# Patient Record
Sex: MALE | Race: WHITE | Employment: OTHER | URBAN - METROPOLITAN AREA
[De-identification: names, ages, dates, MRNs, and addresses within clinical notes are randomized per-mention and may not be internally consistent; named-entity substitution may affect disease eponyms.]

---

## 2017-06-06 ENCOUNTER — OFFICE VISIT (OUTPATIENT)
Dept: INTERNAL MEDICINE CLINIC | Facility: CLINIC | Age: 52
End: 2017-06-06

## 2017-06-06 VITALS
OXYGEN SATURATION: 96 % | HEIGHT: 70 IN | SYSTOLIC BLOOD PRESSURE: 135 MMHG | WEIGHT: 207.69 LBS | HEART RATE: 56 BPM | BODY MASS INDEX: 29.73 KG/M2 | DIASTOLIC BLOOD PRESSURE: 88 MMHG

## 2017-06-06 DIAGNOSIS — J45.20 MILD INTERMITTENT ASTHMA WITHOUT COMPLICATION: Primary | ICD-10-CM

## 2017-06-06 PROCEDURE — 99212 OFFICE O/P EST SF 10 MIN: CPT | Performed by: INTERNAL MEDICINE

## 2017-06-06 PROCEDURE — 99202 OFFICE O/P NEW SF 15 MIN: CPT | Performed by: INTERNAL MEDICINE

## 2017-06-06 RX ORDER — ALBUTEROL SULFATE 90 UG/1
2 AEROSOL, METERED RESPIRATORY (INHALATION) EVERY 6 HOURS PRN
Qty: 1 INHALER | Refills: 1 | Status: SHIPPED | OUTPATIENT
Start: 2017-06-06 | End: 2017-11-15

## 2017-06-06 RX ORDER — ALPRAZOLAM 1 MG/1
TABLET ORAL
COMMUNITY

## 2017-06-06 NOTE — PROGRESS NOTES
HPI:    Patient ID: Brenda Jeffries is a 46year old male. Asthma  This is a chronic problem. The current episode started more than 1 year ago. The problem occurs constantly.  Progression since onset: Pt was admitted to [facility] ER for a severe asthma at atraumatic. Eyes: EOM are normal.   Cardiovascular: Normal rate, regular rhythm and normal heart sounds. Exam reveals no gallop and no friction rub. No murmur heard. Pulmonary/Chest: Effort normal and breath sounds normal. No respiratory distress.  H Signed: Micky Anguiano, 6/6/2017, 11:15 AM.    ISHAGUFTA MD,  personally performed the services described in this documentation. All medical record entries made by the scribe were at my direction and in my presence.   I have reviewed the chart and di

## 2017-06-08 ENCOUNTER — TELEPHONE (OUTPATIENT)
Dept: INTERNAL MEDICINE CLINIC | Facility: CLINIC | Age: 52
End: 2017-06-08

## 2017-06-08 NOTE — TELEPHONE ENCOUNTER
Patient is not sure but, one of the inhaler priscribed is not covered by pt's Ins. Please advise pt.

## 2017-06-08 NOTE — TELEPHONE ENCOUNTER
Pt states that the flovent is not covered by his insurance. Please advise on cheaper alternative for maintenance inhaler.

## 2017-06-09 ENCOUNTER — TELEPHONE (OUTPATIENT)
Dept: INTERNAL MEDICINE CLINIC | Facility: CLINIC | Age: 52
End: 2017-06-09

## 2017-06-09 NOTE — TELEPHONE ENCOUNTER
Call transferred by CSS: Pt informed of message below and agrees to contact his insurance and call back with less expensive covered alternative, so Dr Flory Almonte can Rx.

## 2017-06-09 NOTE — TELEPHONE ENCOUNTER
Called [facility] pharmacist stated that flovent is covered through the insurance but is costing the patient $250. Patient would have to check with insurance what medication is in a cheaper tier for him.     TCB- transfer to [facility]

## 2017-06-09 NOTE — TELEPHONE ENCOUNTER
Patient called back, he spoke to his insurance and they informed him that QVAR is covered with the 40 mcg being $45 cheaper than the 80mcg. Please sign med order or modify.

## 2017-10-01 ENCOUNTER — HOSPITAL ENCOUNTER (OUTPATIENT)
Age: 52
Discharge: HOME | End: 2017-10-01
Payer: COMMERCIAL

## 2017-10-01 VITALS
OXYGEN SATURATION: 98 % | TEMPERATURE: 98 F | RESPIRATION RATE: 18 BRPM | DIASTOLIC BLOOD PRESSURE: 75 MMHG | WEIGHT: 205 LBS | HEART RATE: 66 BPM | SYSTOLIC BLOOD PRESSURE: 131 MMHG | HEIGHT: 70.5 IN | BODY MASS INDEX: 29.02 KG/M2

## 2017-10-01 DIAGNOSIS — H10.33 ACUTE BACTERIAL CONJUNCTIVITIS OF BOTH EYES: Primary | ICD-10-CM

## 2017-10-01 PROCEDURE — 99213 OFFICE O/P EST LOW 20 MIN: CPT

## 2017-10-01 RX ORDER — TOBRAMYCIN 3 MG/ML
1 SOLUTION/ DROPS OPHTHALMIC EVERY 6 HOURS
Qty: 1 BOTTLE | Refills: 0 | Status: SHIPPED | OUTPATIENT
Start: 2017-10-01 | End: 2017-10-08

## 2017-10-01 NOTE — ED PROVIDER NOTES
Patient presents with:  Eye Problem      HPI:     China Cueto is a 45year old male who presents today with a chief complaint of lateral eye redness and purulent drainage for the past couple days.   The patient states he has a history of seasonal allerg distress  SKIN: good skin turgor, no obvious rashes  HEENT: atraumatic, normocephalic, TMs bulging bilaterally. Two cold sore present on roof of mouth. Throat pink. No exudate or swelling. Uvula midline. Clear drainage from nose.   EYES: sclera non icteric

## 2017-10-01 NOTE — ED INITIAL ASSESSMENT (HPI)
PATIENT ARRIVED AMBULATORY TO ROOM C/O BILATERAL EYE IRRITATION. PATIENT STATES \"I HAVE ALLERGIES AND ALL THAT STUFF AND I WANT TO MAKE SURE ITS NOT AN INFECTION\" +DRAINAGE PT DENIES PAIN.  PATIENT STATES \"I'M ALWAYS WIPING GOOP OUT OF MY EYES\" +BLURRED

## 2017-11-15 ENCOUNTER — TELEPHONE (OUTPATIENT)
Dept: FAMILY MEDICINE CLINIC | Facility: CLINIC | Age: 52
End: 2017-11-15

## 2017-11-15 RX ORDER — ALBUTEROL SULFATE 90 UG/1
2 AEROSOL, METERED RESPIRATORY (INHALATION) EVERY 6 HOURS PRN
Qty: 1 INHALER | Refills: 2 | Status: SHIPPED | OUTPATIENT
Start: 2017-11-15 | End: 2018-04-09

## 2017-11-15 NOTE — TELEPHONE ENCOUNTER
Pt is calling requesting a refill on medication Albuterol Sulfate HFA (VENTOLIN HFA) 108 (90 Base) MCG/ACT Inhalation Aero Soln

## 2017-11-15 NOTE — TELEPHONE ENCOUNTER
Refilled per written protocol.     Refill Protocol Appointment Criteria Asthma & COPD:   · Appointment scheduled in the past 6 months or in the next 3 months  Recent Outpatient Visits            5 months ago Mild intermittent asthma without complication

## 2018-01-27 ENCOUNTER — HOSPITAL ENCOUNTER (OUTPATIENT)
Age: 53
Discharge: HOME | End: 2018-01-27
Attending: EMERGENCY MEDICINE
Payer: COMMERCIAL

## 2018-01-27 VITALS
DIASTOLIC BLOOD PRESSURE: 60 MMHG | RESPIRATION RATE: 18 BRPM | HEART RATE: 95 BPM | WEIGHT: 207 LBS | BODY MASS INDEX: 28.98 KG/M2 | HEIGHT: 71 IN | OXYGEN SATURATION: 95 % | TEMPERATURE: 98 F | SYSTOLIC BLOOD PRESSURE: 115 MMHG

## 2018-01-27 DIAGNOSIS — Z76.0 ENCOUNTER FOR MEDICATION REFILL: ICD-10-CM

## 2018-01-27 DIAGNOSIS — J11.1 INFLUENZA: Primary | ICD-10-CM

## 2018-01-27 PROCEDURE — 99213 OFFICE O/P EST LOW 20 MIN: CPT

## 2018-01-27 PROCEDURE — 99214 OFFICE O/P EST MOD 30 MIN: CPT

## 2018-01-27 RX ORDER — ALBUTEROL SULFATE 90 UG/1
2 AEROSOL, METERED RESPIRATORY (INHALATION) EVERY 4 HOURS PRN
Qty: 1 INHALER | Refills: 0 | Status: SHIPPED | OUTPATIENT
Start: 2018-01-27 | End: 2018-08-14

## 2018-01-27 NOTE — ED PROVIDER NOTES
Patient Seen in: [facility]    History   Patient presents with:  Flu    Stated Complaint: \"Flu\"    HPI  Patient states approximately 1 week ago he started with flulike symptoms.   Patient describes fevers and chills prof external ear normal.   Left Ear: Tympanic membrane and external ear normal.   Nose: Rhinorrhea present.    Mouth/Throat: Uvula is midline, oropharynx is clear and moist and mucous membranes are normal.   Eyes: Conjunctivae and EOM are normal.   Neck: Normal discuss with provider.

## 2018-01-27 NOTE — ED INITIAL ASSESSMENT (HPI)
Sick for 1 week. Low grade temp, body aches, headache. C/o congestion and cough. No sore throat. No N/V/D. C/o \"severe\" fatigue. Eating and drinking well. Bilateral ear pressure.

## 2018-02-08 NOTE — TELEPHONE ENCOUNTER
[facility] calling and stts the pt called his insurance and the med below needs to be changed to 40MCG  Please advise  Fluticasone Propionate HFA (FLOVENT HFA) 110 MCG/ACT Inhalation Aerosol 3 Can 3 6/6/2017 6/1/2018      Sig :  Inhale 2 puffs into the lungs 2 (t
See 6/8 telephone encounter
room air

## 2018-03-11 ENCOUNTER — HOSPITAL ENCOUNTER (OUTPATIENT)
Age: 53
Discharge: HOME | End: 2018-03-11
Payer: COMMERCIAL

## 2018-03-11 VITALS
HEART RATE: 64 BPM | BODY MASS INDEX: 28.7 KG/M2 | DIASTOLIC BLOOD PRESSURE: 84 MMHG | RESPIRATION RATE: 18 BRPM | OXYGEN SATURATION: 98 % | TEMPERATURE: 98 F | WEIGHT: 205 LBS | SYSTOLIC BLOOD PRESSURE: 150 MMHG | HEIGHT: 71 IN

## 2018-03-11 DIAGNOSIS — Z76.0 ENCOUNTER FOR MEDICATION REFILL: Primary | ICD-10-CM

## 2018-03-11 PROCEDURE — 99213 OFFICE O/P EST LOW 20 MIN: CPT

## 2018-03-11 PROCEDURE — 99214 OFFICE O/P EST MOD 30 MIN: CPT

## 2018-03-11 RX ORDER — ALBUTEROL SULFATE 90 UG/1
2 AEROSOL, METERED RESPIRATORY (INHALATION) EVERY 4 HOURS PRN
Qty: 1 INHALER | Refills: 0 | Status: SHIPPED | OUTPATIENT
Start: 2018-03-11 | End: 2018-04-10

## 2018-03-11 NOTE — ED NOTES
Patient presents requesting a refill on his albuterol inhaler. Patient has no complaints, ls clear to auscultation.

## 2018-04-09 ENCOUNTER — OFFICE VISIT (OUTPATIENT)
Dept: INTERNAL MEDICINE CLINIC | Facility: CLINIC | Age: 53
End: 2018-04-09

## 2018-04-09 VITALS
SYSTOLIC BLOOD PRESSURE: 180 MMHG | BODY MASS INDEX: 30 KG/M2 | HEART RATE: 69 BPM | WEIGHT: 213.38 LBS | DIASTOLIC BLOOD PRESSURE: 81 MMHG

## 2018-04-09 DIAGNOSIS — I10 ESSENTIAL HYPERTENSION: ICD-10-CM

## 2018-04-09 DIAGNOSIS — J45.909 MODERATE ASTHMA WITHOUT COMPLICATION, UNSPECIFIED WHETHER PERSISTENT: Primary | ICD-10-CM

## 2018-04-09 PROCEDURE — 99214 OFFICE O/P EST MOD 30 MIN: CPT | Performed by: INTERNAL MEDICINE

## 2018-04-09 PROCEDURE — 99212 OFFICE O/P EST SF 10 MIN: CPT | Performed by: INTERNAL MEDICINE

## 2018-04-09 RX ORDER — ALBUTEROL SULFATE 90 UG/1
2 AEROSOL, METERED RESPIRATORY (INHALATION) EVERY 6 HOURS PRN
Qty: 1 INHALER | Refills: 2 | Status: SHIPPED | OUTPATIENT
Start: 2018-04-09 | End: 2018-08-08

## 2018-04-09 NOTE — PROGRESS NOTES
HPI:    Patient ID: Glynn Puente is a 45year old male. Asthma  This is a chronic problem. The current episode started more than 1 year ago. The problem occurs constantly. Severity: The patient has an ACT score of 17.  Associated symptoms include whee Family History   Problem Relation Age of Onset   • Diabetes Brother    • Diabetes Paternal Aunt       Smoking status: Never Smoker                                                              Smokeless tobacco: Never Used                      Alcohol use behavior is normal. Judgment normal.   Nursing note and vitals reviewed. 04/09/18  1142   BP: (!) 180/81   Pulse: 69   Weight: 213 lb 6.4 oz (96.8 kg)         Body mass index is 29.76 kg/m².          ASSESSMENT/PLAN:   (J45.909) Moderate asthma withou the record reflects my personal performance and is accurate and complete.   Radha Cardozo MD, 4/10/2018, 6:39 AM

## 2018-08-09 NOTE — TELEPHONE ENCOUNTER
Refill Protocol Appointment Criteria  · Appointment scheduled in the past 6 months or in the next 3 months  Recent Outpatient Visits            4 months ago Moderate asthma without complication, unspecified whether persistent    [address]

## 2018-08-14 ENCOUNTER — OFFICE VISIT (OUTPATIENT)
Dept: INTERNAL MEDICINE CLINIC | Facility: CLINIC | Age: 53
End: 2018-08-14
Payer: COMMERCIAL

## 2018-08-14 VITALS
HEART RATE: 61 BPM | OXYGEN SATURATION: 100 % | SYSTOLIC BLOOD PRESSURE: 137 MMHG | HEIGHT: 70 IN | DIASTOLIC BLOOD PRESSURE: 83 MMHG | WEIGHT: 214 LBS | BODY MASS INDEX: 30.64 KG/M2 | TEMPERATURE: 98 F

## 2018-08-14 DIAGNOSIS — H91.93 BILATERAL HEARING LOSS, UNSPECIFIED HEARING LOSS TYPE: ICD-10-CM

## 2018-08-14 DIAGNOSIS — J06.9 UPPER RESPIRATORY TRACT INFECTION, UNSPECIFIED TYPE: ICD-10-CM

## 2018-08-14 DIAGNOSIS — J45.909 MODERATE ASTHMA WITHOUT COMPLICATION, UNSPECIFIED WHETHER PERSISTENT: Primary | ICD-10-CM

## 2018-08-14 PROCEDURE — 99214 OFFICE O/P EST MOD 30 MIN: CPT | Performed by: INTERNAL MEDICINE

## 2018-08-14 PROCEDURE — 99212 OFFICE O/P EST SF 10 MIN: CPT | Performed by: INTERNAL MEDICINE

## 2018-08-14 RX ORDER — ALBUTEROL SULFATE 90 UG/1
AEROSOL, METERED RESPIRATORY (INHALATION)
Qty: 8.5 INHALER | Refills: 6 | Status: SHIPPED | OUTPATIENT
Start: 2018-08-14 | End: 2019-08-30

## 2018-08-14 RX ORDER — METHYLPREDNISOLONE 4 MG/1
TABLET ORAL
Qty: 1 KIT | Refills: 0 | Status: SHIPPED | OUTPATIENT
Start: 2018-08-14

## 2018-08-14 RX ORDER — FLUTICASONE PROPIONATE AND SALMETEROL 250; 50 UG/1; UG/1
1 POWDER RESPIRATORY (INHALATION) EVERY 12 HOURS SCHEDULED
Qty: 3 EACH | Refills: 0 | Status: SHIPPED | OUTPATIENT
Start: 2018-08-14

## 2018-08-14 RX ORDER — FLUTICASONE PROPIONATE AND SALMETEROL 250; 50 UG/1; UG/1
1 POWDER RESPIRATORY (INHALATION) EVERY 12 HOURS SCHEDULED
Qty: 3 EACH | Refills: 0 | Status: SHIPPED | OUTPATIENT
Start: 2018-08-14 | End: 2018-08-14

## 2018-08-14 RX ORDER — ALBUTEROL SULFATE 90 UG/1
AEROSOL, METERED RESPIRATORY (INHALATION)
Qty: 8.5 INHALER | Refills: 6 | Status: SHIPPED | OUTPATIENT
Start: 2018-08-14 | End: 2018-08-14

## 2018-08-14 RX ORDER — METHYLPREDNISOLONE 4 MG/1
TABLET ORAL
Qty: 1 KIT | Refills: 0 | Status: SHIPPED | OUTPATIENT
Start: 2018-08-14 | End: 2018-08-14

## 2018-08-14 NOTE — PATIENT INSTRUCTIONS
Controlling Your Asthma  You can do a lot to manage your asthma and improve your quality of life. You will need to work with your healthcare provider to develop a plan. But it’s up to you to put this plan into action.   Why you need to take control  You n FLUTICASONE; SALMETEROL (floo TIK a sone; sal ME te role) inhalation is a combination of two medicines that decrease inflammation and help to open up the airways of your lungs. It is used to treat asthma. Do NOT use for an acute asthma attack.   How should This medicine may also interact with the following medications:  · aminophylline or theophylline  · antiviral medicines for HIV or AIDS  · beta-blockers like metoprolol and propranolol  · certain antibiotics like clarithromycin, erythromycin, levofloxacin, NEVER use this medicine for an acute asthma attack. You should use your short-acting rescue inhalers for this purpose. If your symptoms get worse or if you need your short-acting inhalers more often, call your doctor right away.   This medicine may increase

## 2018-08-14 NOTE — PROGRESS NOTES
Patient ID: Trino Cornell is a 45year old male. Patient presents with:  Asthma: Chronic Asthma follow up   Referral: Audiologist due to hearing loss        HISTORY OF PRESENT ILLNESS:   HPI  Patient presents for above.   Here for follow-up of multiple MG Oral Tablet Therapy Pack, As directed., Disp: 1 kit, Rfl: 0  •  ALPRAZolam (XANAX) 1 MG Oral Tab, Take by mouth., Disp: , Rfl:   •  FLUoxetine HCl (PROZAC OR), Take by mouth., Disp: , Rfl:     Allergies:  Penicillins                 Comment:Other reacti BY MOUTH EVERY 6 HOURS AS NEEDED FOR WHEEZING OR SHORTNESS OF BREATH  Dispense: 8.5 Inhaler; Refill: 6  · fluticasone-salmeterol 250-50 MCG/DOSE Inhalation Aerosol Powder, Breath Activated; Inhale 1 puff into the lungs every 12 (twelve) hours.   Dispense: 3

## 2018-12-19 ENCOUNTER — OFFICE VISIT (OUTPATIENT)
Dept: AUDIOLOGY | Facility: CLINIC | Age: 53
End: 2018-12-19
Payer: COMMERCIAL

## 2018-12-19 DIAGNOSIS — H90.3 SENSORINEURAL HEARING LOSS, BILATERAL: Primary | ICD-10-CM

## 2018-12-19 PROCEDURE — 92567 TYMPANOMETRY: CPT | Performed by: AUDIOLOGIST

## 2018-12-19 PROCEDURE — V5261 HEARING AID, DIGIT, BIN, BTE: HCPCS | Performed by: AUDIOLOGIST

## 2018-12-19 PROCEDURE — 92557 COMPREHENSIVE HEARING TEST: CPT | Performed by: AUDIOLOGIST

## 2018-12-19 NOTE — PROGRESS NOTES
AUDIOGRAM     Quita Nuno was referred for testing by Julio Estrada. 9/12/1965  [id number]    PT noted progressive hearing loss causing difficulty hearing/understanding speech in groups, in background noise, and on TV.     Otoscopic Inspection:  Right hearing test and speech audiogram were given to the pt. Recommendations: Follow-up with Dr Yosef Mukherjee  RT for a hearing aid evaluation to assess communication needs and amplification options.     Thank you for this referral.    Lisa Biswas  12/19/

## 2019-08-30 DIAGNOSIS — J45.909 MODERATE ASTHMA WITHOUT COMPLICATION, UNSPECIFIED WHETHER PERSISTENT: ICD-10-CM

## 2019-08-31 RX ORDER — ALBUTEROL SULFATE 90 UG/1
AEROSOL, METERED RESPIRATORY (INHALATION)
Qty: 3 INHALER | Refills: 0 | Status: SHIPPED | OUTPATIENT
Start: 2019-08-31

## 2020-03-12 NOTE — ED PROVIDER NOTES
Patient Seen in: [address] [facility]    History   Patient presents with:  Medication Administration    Stated Complaint: med refill    HPI    Patient is a 49-year-old male with a history of asthma presents for medication refill. Nose: Nose normal.   Mouth/Throat: Oropharynx is clear and moist.   Eyes: Conjunctivae are normal. Pupils are equal, round, and reactive to light. Neck: Normal range of motion. Neck supple.    Cardiovascular: Normal rate, regular rhythm, normal heart so

## 2020-10-13 ENCOUNTER — HOSPITAL ENCOUNTER (OUTPATIENT)
Age: 55
Discharge: HOME | End: 2020-10-13
Payer: COMMERCIAL

## 2020-10-13 VITALS
RESPIRATION RATE: 18 BRPM | OXYGEN SATURATION: 100 % | DIASTOLIC BLOOD PRESSURE: 70 MMHG | HEART RATE: 58 BPM | SYSTOLIC BLOOD PRESSURE: 135 MMHG | TEMPERATURE: 98 F

## 2020-10-13 DIAGNOSIS — H10.32 ACUTE CONJUNCTIVITIS OF LEFT EYE, UNSPECIFIED ACUTE CONJUNCTIVITIS TYPE: Primary | ICD-10-CM

## 2020-10-13 PROCEDURE — 99213 OFFICE O/P EST LOW 20 MIN: CPT

## 2020-10-13 PROCEDURE — 99214 OFFICE O/P EST MOD 30 MIN: CPT

## 2020-10-13 RX ORDER — CLINDAMYCIN HYDROCHLORIDE 150 MG/1
450 CAPSULE ORAL 3 TIMES DAILY
Qty: 90 CAPSULE | Refills: 0 | Status: SHIPPED | OUTPATIENT
Start: 2020-10-13 | End: 2020-10-23

## 2020-10-13 RX ORDER — POLYMYXIN B SULFATE AND TRIMETHOPRIM 1; 10000 MG/ML; [USP'U]/ML
1 SOLUTION OPHTHALMIC
Qty: 10 ML | Refills: 0 | Status: SHIPPED | OUTPATIENT
Start: 2020-10-13 | End: 2020-10-18

## 2020-10-13 NOTE — ED PROVIDER NOTES
[facility]      History   Patient presents with:   Eye Visual Problem    Stated Complaint: Covid 23 test    HPI    61 yo male with PMH of eczema here for evaluation of fever, headache and left eye swelling normal.      Mouth/Throat:      Mouth: Mucous membranes are moist.   Eyes:      Extraocular Movements: Extraocular movements intact. Left eye: Normal extraocular motion. Conjunctiva/sclera:      Left eye: Left conjunctiva is injected. No exudate. days., Normal, Disp-10 mL, R-0    clindamycin HCl 150 MG Oral Cap  Take 3 capsules (450 mg total) by mouth 3 (three) times daily for 10 days. , Normal, Disp-90 capsule, R-0

## 2020-10-13 NOTE — ED INITIAL ASSESSMENT (HPI)
r eye redness, swelling and drainage  since this am, also had a temp over 100 at work, here for covid testing

## 2025-01-03 ENCOUNTER — HOSPITAL ENCOUNTER (OUTPATIENT)
Age: 60
Discharge: HOME | End: 2025-01-03
Attending: EMERGENCY MEDICINE
Payer: COMMERCIAL

## 2025-01-03 ENCOUNTER — APPOINTMENT (OUTPATIENT)
Dept: GENERAL RADIOLOGY | Age: 60
End: 2025-01-03
Attending: EMERGENCY MEDICINE
Payer: COMMERCIAL

## 2025-01-03 VITALS
HEART RATE: 73 BPM | TEMPERATURE: 98 F | SYSTOLIC BLOOD PRESSURE: 116 MMHG | OXYGEN SATURATION: 96 % | DIASTOLIC BLOOD PRESSURE: 59 MMHG | RESPIRATION RATE: 19 BRPM

## 2025-01-03 DIAGNOSIS — J18.9 COMMUNITY ACQUIRED PNEUMONIA, UNSPECIFIED LATERALITY: Primary | ICD-10-CM

## 2025-01-03 PROCEDURE — 71046 X-RAY EXAM CHEST 2 VIEWS: CPT | Performed by: EMERGENCY MEDICINE

## 2025-01-03 PROCEDURE — 99203 OFFICE O/P NEW LOW 30 MIN: CPT

## 2025-01-03 RX ORDER — AZITHROMYCIN 250 MG/1
TABLET, FILM COATED ORAL
Qty: 6 TABLET | Refills: 0 | Status: SHIPPED | OUTPATIENT
Start: 2025-01-03 | End: 2025-01-08

## 2025-01-03 RX ORDER — AMOXICILLIN 500 MG/1
1000 TABLET, FILM COATED ORAL 3 TIMES DAILY
Qty: 60 TABLET | Refills: 0 | Status: SHIPPED | OUTPATIENT
Start: 2025-01-03 | End: 2025-01-13

## 2025-01-03 RX ORDER — ALBUTEROL SULFATE 90 UG/1
2 INHALANT RESPIRATORY (INHALATION) EVERY 4 HOURS PRN
Qty: 1 EACH | Refills: 0 | Status: SHIPPED | OUTPATIENT
Start: 2025-01-03 | End: 2025-02-02

## 2025-01-03 NOTE — ED INITIAL ASSESSMENT (HPI)
Patient with fatigue and cough since prior to 12/25  States he has wet , tan colored phlegm with cough   No recent fever

## 2025-01-18 NOTE — ED PROVIDER NOTES
Patient Seen in: [facility]      History     Chief Complaint   Patient presents with    Cough/URI     Stated Complaint: fever, chills, cough, fatigue    Subjective:   HPI      58 yo male has been sick since before tony. Complains of fatigue and cough. Cough is productive and worsening. No current fever.     Objective:     No pertinent past medical history.            No pertinent past surgical history.              No pertinent social history.            Review of Systems    Positive for stated complaint: fever, chills, cough, fatigue  Other systems are as noted in HPI.  Constitutional and vital signs reviewed.      All other systems reviewed and negative except as noted above.    Physical Exam     ED Triage Vitals [01/03/25 1113]   /59   Pulse 73   Resp 19   Temp 98.2 °F (36.8 °C)   Temp src Oral   SpO2 96 %   O2 Device None (Room air)       Current Vitals:   No data recorded    Physical Exam  Vitals and nursing note reviewed.   Constitutional:       Appearance: Normal appearance. He is well-developed.   HENT:      Head: Normocephalic and atraumatic.      Mouth/Throat:      Mouth: Mucous membranes are moist.   Cardiovascular:      Rate and Rhythm: Normal rate and regular rhythm.      Heart sounds: Normal heart sounds.   Pulmonary:      Effort: Pulmonary effort is normal. No respiratory distress.      Breath sounds: Wheezing and rhonchi (scattered) present.   Skin:     General: Skin is warm and dry.      Capillary Refill: Capillary refill takes less than 2 seconds.   Neurological:      General: No focal deficit present.      Mental Status: He is alert.   Psychiatric:         Mood and Affect: Mood normal.         Behavior: Behavior normal.            ED Course   Labs Reviewed - No data to display                MDM           Medical Decision Making    Viral bronchitis vs pneumonia.   CXR images reviewed by myself. Pneumonia noted.   Discharge on amoxicillin, azithromycin and albuterol as  prescribed.   Disposition and Plan     Clinical Impression:  1. Community acquired pneumonia, unspecified laterality         Disposition:  Discharge  1/3/2025 11:51 am    Follow-up:  Jackson Ricketts MD  [address] [place] [id number]  [phone]    In 2 weeks            Medications Prescribed:  Discharge Medication List as of 1/3/2025 11:53 AM        START taking these medications    Details   amoxicillin 500 MG Oral Tab Take 2 tablets (1,000 mg total) by mouth 3 (three) times daily for 10 days., Normal, Disp-60 tablet, R-0      azithromycin (ZITHROMAX Z-PRATEEK) 250 MG Oral Tab Take 2 tablets (500 mg total) by mouth daily for 1 day, THEN 1 tablet (250 mg total) daily for 4 days., Normal, Disp-6 tablet, R-0      !! albuterol 108 (90 Base) MCG/ACT Inhalation Aero Soln Inhale 2 puffs into the lungs every 4 (four) hours as needed for Wheezing., Normal, Disp-1 each, R-0       !! - Potential duplicate medications found. Please discuss with provider.              Supplementary Documentation:

## (undated) NOTE — MR AVS SNAPSHOT
[facility]  [address], [address]  [phone]               Thank you for choosing us for your health care visit with C. Stephani Boas, MD.  We are glad to serve you and happy to provide you with this summary of - Albuterol Sulfate  (90 Base) MCG/ACT Aers  - Fluticasone Propionate  MCG/ACT [facility]     Healthy Diet and Regular Exercise  [employer] [address] [place] [address] for making healthy food

## (undated) NOTE — LETTER
Date & Time: 10/13/2020, 5:04 PM  Patient: Kelsey Heath  Encounter Provider(s):    Merari Little PA-C       To Whom It May Concern:    Luca Gupta was seen and treated in our department on 10/13/2020.  He can return to work when MONTY Murillo